# Patient Record
Sex: MALE | Race: WHITE | Employment: UNEMPLOYED | URBAN - METROPOLITAN AREA
[De-identification: names, ages, dates, MRNs, and addresses within clinical notes are randomized per-mention and may not be internally consistent; named-entity substitution may affect disease eponyms.]

---

## 2020-06-08 ENCOUNTER — APPOINTMENT (OUTPATIENT)
Dept: GENERAL RADIOLOGY | Age: 55
End: 2020-06-08
Payer: MEDICAID

## 2020-06-08 ENCOUNTER — HOSPITAL ENCOUNTER (EMERGENCY)
Age: 55
Discharge: HOME | End: 2020-06-08
Payer: MEDICAID

## 2020-06-08 VITALS
OXYGEN SATURATION: 92 % | HEART RATE: 100 BPM | BODY MASS INDEX: 33.86 KG/M2 | HEIGHT: 72 IN | TEMPERATURE: 98.8 F | WEIGHT: 250 LBS | SYSTOLIC BLOOD PRESSURE: 113 MMHG | RESPIRATION RATE: 20 BRPM | DIASTOLIC BLOOD PRESSURE: 64 MMHG

## 2020-06-08 PROCEDURE — 99212 OFFICE O/P EST SF 10 MIN: CPT

## 2020-06-08 PROCEDURE — 71046 X-RAY EXAM CHEST 2 VIEWS: CPT

## 2020-06-08 RX ORDER — ZIPRASIDONE HYDROCHLORIDE 80 MG/1
80 CAPSULE ORAL 2 TIMES DAILY WITH MEALS
COMMUNITY

## 2020-06-08 RX ORDER — SERTRALINE HYDROCHLORIDE 100 MG/1
100 TABLET, FILM COATED ORAL DAILY
COMMUNITY

## 2020-06-08 RX ORDER — HALOPERIDOL 10 MG/1
5 TABLET ORAL DAILY
COMMUNITY

## 2020-06-08 RX ORDER — DULOXETIN HYDROCHLORIDE 30 MG/1
30 CAPSULE, DELAYED RELEASE ORAL 3 TIMES DAILY
COMMUNITY

## 2020-06-08 RX ORDER — DOXYCYCLINE 100 MG/1
100 CAPSULE ORAL 2 TIMES DAILY
Qty: 20 CAPSULE | Refills: 0 | Status: SHIPPED | OUTPATIENT
Start: 2020-06-08 | End: 2020-06-18

## 2020-06-08 RX ORDER — PREDNISONE 20 MG/1
TABLET ORAL
Qty: 8 TABLET | Refills: 0 | Status: SHIPPED | OUTPATIENT
Start: 2020-06-08

## 2020-06-08 RX ORDER — ALBUTEROL SULFATE 90 UG/1
2 AEROSOL, METERED RESPIRATORY (INHALATION) 4 TIMES DAILY PRN
Qty: 1 INHALER | Refills: 0 | Status: SHIPPED | OUTPATIENT
Start: 2020-06-08

## 2020-06-08 RX ORDER — GUAIFENESIN AND DEXTROMETHORPHAN HYDROBROMIDE 1200; 60 MG/1; MG/1
1 TABLET, EXTENDED RELEASE ORAL EVERY 12 HOURS PRN
Qty: 12 TABLET | Refills: 0 | Status: SHIPPED | OUTPATIENT
Start: 2020-06-08

## 2020-06-08 RX ORDER — OLANZAPINE 10 MG/1
10 TABLET ORAL NIGHTLY
COMMUNITY

## 2020-06-08 NOTE — ED PROVIDER NOTES
This is a 64-year-old male that presents to urgent care complaining of a cough off and on for the past week or so. He had been to a pharmacy and took a COVID screening test and that was negative. Has been using some cough drops. Had a fever last week. Currently denies any shortness of breath. No chest pain. No abdominal pain nausea vomiting diarrhea or urinary symptoms on first contact patient he appears to be in no acute distress. He does have a history of smoking. Review of Systems   Constitutional:        Pertinent positives and negatives are stated within HPI, all other systems reviewed and are negative. Physical Exam  Vitals signs and nursing note reviewed. Constitutional:       Appearance: He is well-developed. HENT:      Head: Normocephalic and atraumatic. Jaw: No trismus. Right Ear: Hearing, tympanic membrane, ear canal and external ear normal.      Left Ear: Hearing, tympanic membrane, ear canal and external ear normal.      Nose: Nose normal.      Right Sinus: No maxillary sinus tenderness or frontal sinus tenderness. Left Sinus: No maxillary sinus tenderness or frontal sinus tenderness. Mouth/Throat:      Pharynx: Uvula midline. No uvula swelling. Eyes:      General: Lids are normal.      Conjunctiva/sclera: Conjunctivae normal.      Pupils: Pupils are equal, round, and reactive to light. Neck:      Musculoskeletal: Full passive range of motion without pain, normal range of motion and neck supple. Cardiovascular:      Rate and Rhythm: Normal rate and regular rhythm. Heart sounds: Normal heart sounds. No murmur. Pulmonary:      Effort: Pulmonary effort is normal.      Breath sounds: Normal breath sounds. Abdominal:      General: Bowel sounds are normal.      Palpations: Abdomen is soft. Abdomen is not rigid. Tenderness: There is no abdominal tenderness. There is no guarding or rebound. Skin:     General: Skin is warm and dry.

## 2020-06-09 ENCOUNTER — CARE COORDINATION (OUTPATIENT)
Dept: CARE COORDINATION | Age: 55
End: 2020-06-09

## 2020-06-10 ENCOUNTER — CARE COORDINATION (OUTPATIENT)
Dept: CARE COORDINATION | Age: 55
End: 2020-06-10

## 2020-06-10 NOTE — CARE COORDINATION
COVID-19 ED/Flu Clinic Initial Outreach Note    Patient contacted regarding recent visit for viral symptoms. This Eliz Dyer attempted to contact the patient for second outreach by telephone to perform post discharge call. Left message. Will no longer make an outreach.

## 2022-04-01 ENCOUNTER — TELEPHONE (OUTPATIENT)
Dept: NEUROLOGY | Age: 57
End: 2022-04-01

## 2022-04-01 ENCOUNTER — OFFICE VISIT (OUTPATIENT)
Dept: NEUROLOGY | Age: 57
End: 2022-04-01
Payer: MEDICAID

## 2022-04-01 VITALS
HEIGHT: 72 IN | OXYGEN SATURATION: 99 % | WEIGHT: 227 LBS | SYSTOLIC BLOOD PRESSURE: 113 MMHG | HEART RATE: 84 BPM | DIASTOLIC BLOOD PRESSURE: 66 MMHG | BODY MASS INDEX: 30.75 KG/M2 | TEMPERATURE: 97.8 F

## 2022-04-01 DIAGNOSIS — R26.89 BALANCE PROBLEMS: ICD-10-CM

## 2022-04-01 DIAGNOSIS — R41.3 MEMORY LOSS: Primary | ICD-10-CM

## 2022-04-01 PROCEDURE — G8427 DOCREV CUR MEDS BY ELIG CLIN: HCPCS | Performed by: NURSE PRACTITIONER

## 2022-04-01 PROCEDURE — 99204 OFFICE O/P NEW MOD 45 MIN: CPT | Performed by: NURSE PRACTITIONER

## 2022-04-01 PROCEDURE — 1036F TOBACCO NON-USER: CPT | Performed by: NURSE PRACTITIONER

## 2022-04-01 PROCEDURE — G8417 CALC BMI ABV UP PARAM F/U: HCPCS | Performed by: NURSE PRACTITIONER

## 2022-04-01 PROCEDURE — 3017F COLORECTAL CA SCREEN DOC REV: CPT | Performed by: NURSE PRACTITIONER

## 2022-04-01 NOTE — PROGRESS NOTES
[facility]. Max Gomez M.D., F.A.C.P. Harmeet Coats, LINDA, APRN, CNS  Margaret Dakin. Dinorah Ahumada, MSN, APRN-FNP-C  Donya Marques MSN, APRN, FNP-C  Sabina BLACK PA-C  Cachorro Has MSN, APRN, FNP-C  [address], [address]  [place], [address] [address]  Phone: [phone]  Fax: [phone]       Dayna Espinosa is a 59 y.o. right handed male     Patient presents to neurology clinic today with concerns for memory loss. Patient presents with his sister Davidson Calixto; his sister Sirena Hedrick waited in the lobby. Patient is a questionable historian. Patient sister Davidson Calixto provides most of the history. Past Medical History:     Past Medical History:   Diagnosis Date    Depression     Hyperlipidemia     Schizoaffective disorder ([facility] [address]     Type 2 diabetes mellitus ([facility] [address]        Past Surgical History:       No past surgical history on file. Allergies:       Patient has no known allergies. Medications:     Prior to Admission medications    Medication Sig Start Date End Date Taking? Authorizing Provider   ziprasidone (GEODON) 80 MG capsule Take 80 mg by mouth 2 times daily (with meals)   Yes Historical Provider, MD   sertraline (ZOLOFT) 100 MG tablet Take 100 mg by mouth daily   Yes Historical Provider, MD   haloperidol (HALDOL) 10 MG tablet Take 10 mg by mouth daily   Yes Historical Provider, MD   OLANZapine (ZYPREXA) 10 MG tablet Take 10 mg by mouth nightly   Yes Historical Provider, MD   DULoxetine (CYMBALTA) 30 MG extended release capsule Take 30 mg by mouth 3 times daily   Yes Historical Provider, MD   predniSONE (DELTASONE) 20 MG tablet Take 2 tablets by mouth daily x 4 days.  6/8/20  Yes Ezekiel Hernandez PA-C   Dextromethorphan-guaiFENesin (MUCINEX DM MAXIMUM STRENGTH)  MG TB12 Take 1 tablet by mouth every 12 hours as needed (cough) 6/8/20  Yes Ezekiel Hernandez PA-C   albuterol sulfate HFA (PROVENTIL HFA) 108 (90 Base) MCG/ACT inhaler Inhale 2 puffs into the lungs 4 times daily as needed for Wheezing or Shortness of Breath 6/8/20  Yes Kristian Lozano PA-C       Social History:        reports that he quit smoking about 9 years ago. His smoking use included cigarettes. He has never used smokeless tobacco. He reports that he does not drink alcohol and does not use drugs. Patient is single and has 3 children--- His daughter passed away at age 22 3 years ago; he has another daughter age 32 and a son age 21. Review of Systems:     (+) Memory issues  No chest pain or palpitations  No SOB  No vertigo, lightheadedness or loss of consciousness  No falls, tripping or stumbling  No incontinence of bowels or bladder  No itching or bruising appreciated  No numbness, tingling or focal arm/leg weakness    ROS is otherwise negative    Family History:     No family history on file. History of Present Illness:     Patient's family noticed memory issues in 2019 when he moved back here from [place]. Patient sister states she noticed he was having trouble understanding and comprehending directions. Patient is also forgetful of the specifics of these jobs in the past.  Patient is forgetful of task such as forgetting to fill the . Patient needs guidance in checking his emails and doing other things on the computer. Patient is forgetting to take his medications and forgetting what he ate for dinner the night before. Patient still drives but has got mixed up when going to [place] to see his kids and gets lost when driving to new places as he does not understand how to use a GPS successfully. Patient also gets confused when using a different route to go somewhere. Patient also has quite frequently how to get somewhere. Patient is to write a list to go to the grocery store and has to be reminded of upcoming birthdays. Patient sister manages his finances. Patient cooks with his mom at times but they do receive mobile meals Monday through Friday.   Patient is working with a job counseling specialist and is having a hard time responding to questions being asked of him even if they have been worked on before. Patient is not forgetting familiar people or wandering of. There has been no behavioral changes. Of note patient was admitted to the psych unit for about 10 days in August 2021. Patient's daughter did pass about 3 years ago in August.  There has been no neuro evaluation or neuroimaging completed in the past.  Patient had labs completed with PCP and is set to have some soon. These were not sent over with his file for today. She denies dizziness, headache, speech or swallowing difficulty, LOC, seizure activity or focal weakness. Patient sleeps approximately 9 hours at night. He eats 3 meals a day and drinks 4 to 5 cups of water. Patient drinks diet Pepsi or diet Coke 16 ounces 3-4 times a day. Patient reports a mild stress level. Patient walks a few times a week for exercise. Objective:       /66   Pulse 84   Temp 97.8 °F (36.6 °C) (Temporal)   Ht 6' (1.829 m)   Wt 227 lb (103 kg)   SpO2 99%   BMI 30.79 kg/m²     General appearance: alert, appears stated age, cooperative and in no distress  Head: normocephalic, without obvious abnormality, atraumatic  Eyes: conjunctivae/corneas clear; no drainage  Neck:  supple, symmetrical, trachea midline and thyroid not enlarged  Back: symmetric, no curvature.  ROM normal.   Lungs: clear to auscultation bilaterally  Heart: regular rate and rhythm  Abdomen: soft, non-tender; bowel sounds normal  Extremities: normal, atraumatic, no cyanosis or edema  Pulses: 2+ and symmetric  Skin:  color, texture, turgor normal--no rashes or lesions      Mental Status: alert and oriented x 3--- struggles to state the city but able to state--[place], slow to process but appropriate, difficulty with recall, follows simple and complex commands well    Appropriate attention/concentration  Intact fundus of knowledge  Memories intact    Able to state current president and provide 5 past presidents  Able to state , age, address, phone number  Able to draw a clock without difficulty  Able to write a sentence  Able to spell world backwards    Speech: no dysarthria  Language: no aphasias---reading, writing, repetition, and object identification intact    No Myerson's    Cranial Nerves:  I: smell  intact   II: visual acuity     II: visual fields Full to confrontation   II: pupils PERRL   III,VII: ptosis None   III,IV,VI: extraocular muscles  EOMI without nystagmus   V: mastication Normal   V: facial light touch sensation  Normal   V,VII: corneal reflex     VII: facial muscle function - upper  Normal   VII: facial muscle function - lower Normal   VIII: hearing Normal   IX: soft palate elevation  Normal   IX,X: gag reflex    XI: trapezius strength  5/5   XI: sternocleidomastoid strength 5/5   XI: neck extension strength  5/5   XII: tongue strength  Normal     Motor:  5/5 throughout  Normal bulk and tone  No drift   No abnormal movements    No cogwheeling, bradykinesia, dyskinesia    Sensory:  LT and PP normal  Vibration normal    Coordination:   FN, FFM and HELEN normal  HS normal    Gait:  Normal    No turn en bloc  No shuffling gait    DTR:   Right Brachioradialis reflex 1+  Left Brachioradialis reflex 1+  Right Biceps reflex 1+  Left Biceps reflex 1+  Right Triceps reflex 1+  Left Triceps reflex 1+  Right Quadriceps reflex 1+  Left Quadriceps reflex 1+  Right Achilles reflex 1+  Left Achilles reflex 1+    No Babinskis  No Gavin's    No grasp reflexes    No other pathological reflexes    Laboratory/Radiology:  ry/Radiology:     No recent labs or imaging to review at the time of this visit    Assessment:     Memory decline   Initially noticed by family 2019 however patient was not living by family prior to that   Patient is forgetful of task, specifics of his jobs in the past, and 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 Patient does not manage his finances or cook independently   Patient has to make a list for the grocery store and has to be reminded of upcoming birthdays   Will obtain MRI brain with and without contrast to rule out acute intracranial process   Memory testing may be needed in the near future   Today patient answers appropriately however is slow to respond and has difficulty with recall      Plan:     MRI brain with and without contrast  CBC, CMP, TSH, hepatitis panel, UA, B12, folate, vitamin D, vitamin B1  Call with any issues  Return to office in 3 months        CK Alanis   3:58 PM  4/1/2022    I spent 45 minutes with this patient obtaining the HPI and discussing the exam with greater than 50% of the time providing counseling and education on medications and other treatment plans. All questions were answered prior to leaving my office.

## 2022-04-13 ENCOUNTER — HOSPITAL ENCOUNTER (OUTPATIENT)
Dept: MRI IMAGING | Age: 57
Discharge: HOME | End: 2022-04-15
Payer: MEDICAID

## 2022-04-13 ENCOUNTER — HOSPITAL ENCOUNTER (OUTPATIENT)
Age: 57
Discharge: HOME | End: 2022-04-13
Payer: MEDICAID

## 2022-04-13 DIAGNOSIS — R26.89 BALANCE PROBLEMS: ICD-10-CM

## 2022-04-13 DIAGNOSIS — R41.3 MEMORY LOSS: ICD-10-CM

## 2022-04-13 LAB
ALBUMIN SERPL-MCNC: 4.3 G/DL (ref 3.5–5.2)
ALP BLD-CCNC: 69 U/L (ref 40–129)
ALT SERPL-CCNC: 13 U/L (ref 0–40)
ANION GAP SERPL CALCULATED.3IONS-SCNC: 14 MMOL/L (ref 7–16)
AST SERPL-CCNC: 16 U/L (ref 0–39)
BILIRUB SERPL-MCNC: 0.3 MG/DL (ref 0–1.2)
BILIRUBIN URINE: NEGATIVE
BLOOD, URINE: NEGATIVE
BUN BLDV-MCNC: 13 MG/DL (ref 6–20)
CALCIUM SERPL-MCNC: 9.5 MG/DL (ref 8.6–10.2)
CHLORIDE BLD-SCNC: 99 MMOL/L (ref 98–107)
CLARITY: CLEAR
CO2: 24 MMOL/L (ref 22–29)
COLOR: YELLOW
CREAT SERPL-MCNC: 0.7 MG/DL (ref 0.7–1.2)
FOLATE: 12.7 NG/ML (ref 4.8–24.2)
GFR AFRICAN AMERICAN: >60
GFR NON-AFRICAN AMERICAN: >60 ML/MIN/1.73
GLUCOSE BLD-MCNC: 106 MG/DL (ref 74–99)
GLUCOSE URINE: NEGATIVE MG/DL
HCT VFR BLD CALC: 45.2 % (ref 37–54)
HEMOGLOBIN: 15.4 G/DL (ref 12.5–16.5)
KETONES, URINE: NEGATIVE MG/DL
LEUKOCYTE ESTERASE, URINE: NEGATIVE
MCH RBC QN AUTO: 29.8 PG (ref 26–35)
MCHC RBC AUTO-ENTMCNC: 34.1 % (ref 32–34.5)
MCV RBC AUTO: 87.6 FL (ref 80–99.9)
NITRITE, URINE: NEGATIVE
PDW BLD-RTO: 13.1 FL (ref 11.5–15)
PH UA: 6.5 (ref 5–9)
PLATELET # BLD: 164 E9/L (ref 130–450)
PMV BLD AUTO: 10 FL (ref 7–12)
POTASSIUM SERPL-SCNC: 4.6 MMOL/L (ref 3.5–5)
PROTEIN UA: NEGATIVE MG/DL
RBC # BLD: 5.16 E12/L (ref 3.8–5.8)
SODIUM BLD-SCNC: 137 MMOL/L (ref 132–146)
SPECIFIC GRAVITY UA: 1.01 (ref 1–1.03)
TOTAL PROTEIN: 7.6 G/DL (ref 6.4–8.3)
TSH SERPL DL<=0.05 MIU/L-ACNC: 3.2 UIU/ML (ref 0.27–4.2)
UROBILINOGEN, URINE: 0.2 E.U./DL
VITAMIN B-12: 473 PG/ML (ref 211–946)
VITAMIN D 25-HYDROXY: 59 NG/ML (ref 30–100)
WBC # BLD: 4.1 E9/L (ref 4.5–11.5)

## 2022-04-13 PROCEDURE — 81003 URINALYSIS AUTO W/O SCOPE: CPT

## 2022-04-13 PROCEDURE — 70553 MRI BRAIN STEM W/O & W/DYE: CPT

## 2022-04-13 PROCEDURE — 80053 COMPREHEN METABOLIC PANEL: CPT

## 2022-04-13 PROCEDURE — 84425 ASSAY OF VITAMIN B-1: CPT

## 2022-04-13 PROCEDURE — 85027 COMPLETE CBC AUTOMATED: CPT

## 2022-04-13 PROCEDURE — 84443 ASSAY THYROID STIM HORMONE: CPT

## 2022-04-13 PROCEDURE — 82746 ASSAY OF FOLIC ACID SERUM: CPT

## 2022-04-13 PROCEDURE — 36415 COLL VENOUS BLD VENIPUNCTURE: CPT

## 2022-04-13 PROCEDURE — 80074 ACUTE HEPATITIS PANEL: CPT

## 2022-04-13 PROCEDURE — 82607 VITAMIN B-12: CPT

## 2022-04-13 PROCEDURE — 82306 VITAMIN D 25 HYDROXY: CPT

## 2022-04-13 PROCEDURE — A9577 INJ MULTIHANCE: HCPCS | Performed by: RADIOLOGY

## 2022-04-13 PROCEDURE — 6360000004 HC RX CONTRAST MEDICATION: Performed by: RADIOLOGY

## 2022-04-13 RX ADMIN — GADOBENATE DIMEGLUMINE 20 ML: 529 INJECTION, SOLUTION INTRAVENOUS at 15:54

## 2022-04-14 LAB
HAV IGM SER IA-ACNC: NORMAL
HEPATITIS B CORE IGM ANTIBODY: NORMAL
HEPATITIS B SURFACE ANTIGEN INTERPRETATION: NORMAL
HEPATITIS C ANTIBODY INTERPRETATION: NORMAL

## 2022-04-20 LAB — VITAMIN B1 WHOLE BLOOD: 112 NMOL/L (ref 70–180)

## 2022-05-26 ENCOUNTER — TELEPHONE (OUTPATIENT)
Dept: NEUROLOGY | Age: 57
End: 2022-05-26

## 2022-05-26 NOTE — TELEPHONE ENCOUNTER
Attempted to reach patient's sister per her request but there was no answer. Voicemail left. Will await her call back.

## 2022-05-26 NOTE — TELEPHONE ENCOUNTER
Patient's sister called saying she is growing more concerned for Chase Blevins. She said he gets lost more often. A few days ago he was driving for over an hour because he did not know where he was. Patient's sister would like to know if there is anything they can do between now and Dusty's next appointment. Please advise.

## 2022-06-09 NOTE — TELEPHONE ENCOUNTER
Patient's sister callled back to talk to [facility].   Electronically signed by Lor Valenzuela MA on 6/9/22 at 3:18 PM EDT

## 2022-06-13 NOTE — TELEPHONE ENCOUNTER
Called back patient's sister Lachelle Matthews who states that patient has gotten lost at least twice and did not call her when he got lost.  She is unsure of how many total times he has been out driving and gotten lost but did not call her to let her know. Usually if he calls she is able to direct him as to how to get where he is going. Patient's 2 sisters are very concerned with this. MRI brain and routine labs discussed at length today over the phone. Advised her to follow-up next visit as of only seen him once for further testing such as either formal cognitive evaluation or neuropsych evaluation. Patient's last exam he answered appropriately and follow commands. I will need to see again for further recommendations. All questions and concerns addressed. Time spent on call over 20 minutes. This message was sent for informational purposes only.

## 2022-07-20 ENCOUNTER — OFFICE VISIT (OUTPATIENT)
Dept: NEUROLOGY | Age: 57
End: 2022-07-20
Payer: MEDICAID

## 2022-07-20 VITALS
HEART RATE: 81 BPM | TEMPERATURE: 97 F | OXYGEN SATURATION: 7 % | DIASTOLIC BLOOD PRESSURE: 69 MMHG | SYSTOLIC BLOOD PRESSURE: 121 MMHG

## 2022-07-20 DIAGNOSIS — R41.3 MEMORY LOSS: Primary | ICD-10-CM

## 2022-07-20 PROCEDURE — 99215 OFFICE O/P EST HI 40 MIN: CPT | Performed by: NURSE PRACTITIONER

## 2022-07-20 RX ORDER — BENZTROPINE MESYLATE 2 MG/1
2 TABLET ORAL 2 TIMES DAILY
COMMUNITY

## 2022-07-20 RX ORDER — BUSPIRONE HYDROCHLORIDE 10 MG/1
10 TABLET ORAL 3 TIMES DAILY
COMMUNITY

## 2022-07-20 RX ORDER — HALOPERIDOL DECANOATE 100 MG/ML
INJECTION INTRAMUSCULAR
COMMUNITY
Start: 2022-06-29

## 2022-07-20 RX ORDER — OXCARBAZEPINE 300 MG/1
300 TABLET, FILM COATED ORAL 2 TIMES DAILY
COMMUNITY

## 2022-07-20 RX ORDER — ATORVASTATIN CALCIUM 10 MG/1
10 TABLET, FILM COATED ORAL DAILY
COMMUNITY

## 2022-07-20 RX ORDER — LEVOTHYROXINE SODIUM 0.03 MG/1
TABLET ORAL
COMMUNITY
Start: 2022-04-18

## 2022-07-20 NOTE — PROGRESS NOTES
[address] [facility]. Marci Sullivan M.D., F.A.C.P. Maira Hoyos, LINDA, APRN, CNS  Bill Domingo. Marleen Vega, MSN, APRN-FNP-C  Mamie Reyna MSN, APRN, FNP-C  Gordy BLACK, LISE  [address] MSN, APRN, FNP-C  [address], [facility] [address]  [place], [address] [address]  Phone: [phone]  Fax: [phone]       Carla Urrutia is a 59 y.o. right handed male     Patient presents to neurology clinic today with concerns for memory loss. Patient presents with his sister Allen Canseco. Patient is a poor historian. Patient sister Allen Canseco provides most of the history. Patient's family noticed memory issues in 2019 when he moved back here from [place]. Patient sister states she noticed he was having trouble understanding and comprehending directions. Patient is also forgetful of the specifics of these jobs in the past.  Patient is forgetful of task such as forgetting to fill the . Patient needs guidance in checking his emails and doing other things on the computer. Patient is forgetting to take his medications and forgetting what he ate for dinner the night before. Patient still drives but has got mixed up when going to [place] to see his kids and gets lost when driving to new places as he does not understand how to use a GPS successfully. Patient also gets confused when using a different route to go somewhere. Patient also has quite frequently how to get somewhere. Patient is to write a list to go to the grocery store and has to be reminded of upcoming birthdays. Patient sister manages his finances. Patient cooks with his mom at times but they do receive mobile meals Monday through Friday. Patient is working with a job counseling specialist and is having a hard time responding to questions being asked of him even if they have been worked on before. Patient is not forgetting familiar people or wandering of. There has been no behavioral changes.   Of note patient was admitted to the psych unit for about 10 days in August 2021. Patient's daughter did pass about 3 years ago in August.  There has been no neuro evaluation or neuroimaging completed in the past.  Patient had labs completed with PCP and is set to have some soon. These were not sent over with his file for today. She denies dizziness, headache, speech or swallowing difficulty, LOC, seizure activity or focal weakness. Since patient's last visit he has gotten lost while driving at least twice. Patient had not worked since December 2019 and was assisted to find a job by [employer]. Patient was on his job for about a month when he quit. On 7/7 patient drove past a job and came back home without going to work. On 7/9 patient went to work and stayed for less than an hour before he quit. Patient believes he might of gotten frustrated and quit abruptly but does not give specifics. Patient's sister states that he has been working with a counselor at [employer] who is feeling patient does not talk to her. She has adjusted medications in the past and attempts to help with memory but patient had to be titrated back up. Patient has also been having issues with balance but has not fallen. Patient has to catch himself by holding onto things; patient's sister says this is daily or every other day but patient does not feel it is that frequent or a current issue. Patient sleeps approximately 9 hours at night. He eats 3 meals a day and drinks 4 to 5 cups of water. Patient drinks diet Pepsi or diet Coke 16 ounces 3-4 times a day. Patient reports a mild stress level. Patient walks a few times a week for exercise. He reports that he quit smoking about 10 years ago. His smoking use included cigarettes. He has never used smokeless tobacco. He reports that he does not drink alcohol and does not use drugs.   Patient is single and has 3 children--- His daughter passed away at age 22 3 years ago; he has another daughter age 32 and a son age 21. Objective:       /69   Pulse 81   Temp 97 °F (36.1 °C)   SpO2 (!) 7%     General appearance: alert, appears stated age, cooperative and in no distress  Head: normocephalic, without obvious abnormality, atraumatic  Eyes: conjunctivae/corneas clear; no drainage  Neck:  supple, symmetrical, trachea midline and thyroid not enlarged  Back: symmetric, no curvature.  ROM normal.   Lungs: clear to auscultation bilaterally  Heart: regular rate and rhythm  Abdomen: soft, non-tender; bowel sounds normal  Extremities: normal, atraumatic, no cyanosis or edema  Pulses: 2+ and symmetric  Skin:  color, texture, turgor normal--no rashes or lesions      Mental Status: alert and oriented x 4, slow to process at times but improved today; appropriate, follows simple and complex commands well    Appropriate attention/concentration  Intact fundus of knowledge  Memories intact    Able to state current president and provide 5 past presidents  Able to state , age, address, phone number  Able to draw a clock without difficulty  Able to write a sentence  Able to spell world backwards    Speech: no dysarthria  Language: no aphasias---reading, writing, repetition, and object identification intact    No Myerson's    Cranial Nerves:  I: smell  intact   II: visual acuity     II: visual fields Full to confrontation   II: pupils PERRL   III,VII: ptosis None   III,IV,VI: extraocular muscles  EOMI without nystagmus   V: mastication Normal   V: facial light touch sensation  Normal   V,VII: corneal reflex     VII: facial muscle function - upper  Normal   VII: facial muscle function - lower Normal   VIII: hearing Normal   IX: soft palate elevation  Normal   IX,X: gag reflex    XI: trapezius strength  5/5   XI: sternocleidomastoid strength 5/5   XI: neck extension strength  5/5   XII: tongue strength  Normal     Motor:  5/5 throughout  Normal bulk and tone  No drift   No abnormal movements    No cogwheeling, bradykinesia, dyskinesia    Sensory:  LT and PP normal  Vibration normal    Coordination:   FN, FFM and HELEN normal  HS normal    Gait:  Normal    No turn en bloc  No shuffling gait    DTR:   Right Brachioradialis reflex 1+  Left Brachioradialis reflex 1+  Right Biceps reflex 1+  Left Biceps reflex 1+  Right Triceps reflex 1+  Left Triceps reflex 1+  Right Quadriceps reflex 1+  Left Quadriceps reflex 1+  Right Achilles reflex 1+  Left Achilles reflex 1+    No Babinskis  No Gavin's    No grasp reflexes    No other pathological reflexes    Laboratory/Radiology:  ry/Radiology:     Lab Results   Component Value Date    WBC 4.1 (L) 04/13/2022    HGB 15.4 04/13/2022    HCT 45.2 04/13/2022    MCV 87.6 04/13/2022     04/13/2022      Lab Results   Component Value Date     04/13/2022    K 4.6 04/13/2022    CL 99 04/13/2022    CO2 24 04/13/2022    BUN 13 04/13/2022    CREATININE 0.7 04/13/2022    GLUCOSE 106 (H) 04/13/2022    CALCIUM 9.5 04/13/2022    PROT 7.6 04/13/2022    LABALBU 4.3 04/13/2022    BILITOT 0.3 04/13/2022    ALKPHOS 69 04/13/2022    AST 16 04/13/2022    ALT 13 04/13/2022    LABGLOM >60 04/13/2022    GFRAA >60 04/13/2022     Lab Results   Component Value Date    TSH 3.200 04/13/2022      Lab Results   Component Value Date/Time    VITD25 59 04/13/2022 02:40 PM       Lab Results   Component Value Date    IGMGBKRH81 473 04/13/2022      Lab Results   Component Value Date    FOLATE 12.7 04/13/2022      Hepatitis panel negative  UA negative   B1 normal    MRI brain with and without contrast 4/13/2022: Unremarkable for age    Assessment:     Memory decline   Initially noticed by family 2019 however patient was not living by family prior to that   Patient is forgetful of task, specifics of his jobs in the past, and 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   Patient does not manage his finances or cook independently   Patient has to make a list for the grocery store and has to be reminded of upcoming birthdays   MRI brain with and without contrast ruled out acute intracranial process   Neuro-psych evaluation ordered today   Memory testing may be needed in the near future   Today patient answers all questions appropriately, follows commands and is appropriate     Plan:     Neuropsych eval  Will defer memory agents for now  Call with any issues  Return to office after testing completed         Colletta Schiff, APRN - NP-C   4:39 PM  7/20/2022    I spent 45 minutes with this patient obtaining the HPI and discussing the exam with greater than 50% of the time providing counseling and education on medications and other treatment plans. All questions were answered prior to leaving my office.

## 2022-12-07 ENCOUNTER — SCHEDULED TELEPHONE ENCOUNTER (OUTPATIENT)
Dept: NEUROLOGY | Age: 57
End: 2022-12-07
Payer: MEDICAID

## 2022-12-07 DIAGNOSIS — R41.841 COGNITIVE COMMUNICATION DEFICIT: Primary | ICD-10-CM

## 2022-12-07 PROCEDURE — 99423 OL DIG E/M SVC 21+ MIN: CPT | Performed by: NURSE PRACTITIONER

## 2022-12-07 RX ORDER — PROPRANOLOL HYDROCHLORIDE 10 MG/1
10 TABLET ORAL 2 TIMES DAILY
COMMUNITY

## 2022-12-07 NOTE — PROGRESS NOTES
[address] [facility]. Kacie Morrow M.D., F.A.C.P. Justin Freedman, LINDA, APRN, CNS  Selvinmamie Mendiola. Fredis Chaves, MSN, APRN-FNP-C  Cyrus Godoy MSN, APRN, FNP-C  Larisa BLACK, PA-C  [address] MSN, APRN, FNP-C  [address], [address]  [place], [address] Raffi Whiting  Phone: [phone]  Fax: [phone]           The patient and/or health care decision maker is aware that that he/she may receive a bill for this telephone service, depending on his/her insurance coverage, and has provided verbal consent to proceed: Yes     I affirm this is a Patient Initiated Episode with an Established Patient who has not had a related appointment within my department in the past 7 days or scheduled within the next 24 hours. The patient's identity was verified at the start of the call. Others involved in call: sister Naz Lanza     Total Time: minutes: 35 minutes    Note: not billable if this call serves to triage the patient into an appointment for the relevant concern    HPI:     Since patient's last visit, he feels he is doing well. 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 Patient started working about 2 weeks ago. Patient reports not getting lost but has asked for reassurance and direction at least 1-2 times. Patient has had his neuropsych evaluation which was consistent with dementia with possible vascular changes in the brain contributing to cognitive deficits seen on testing in/or the use of Haldol. Patient had normal scarring in the areas of orientation, visual processing speed, clock drawing, copying a diagram and remembering a diagram.  Patient slightly was below normal and areas of attention, mental processing speed, judging distances, naming objects, minimal words in a given category, verbal learning, verbal memory and shifting from one task to another. See full neuropsych report under media.   Recommendations were discussed with patient and his sister today as well as the results of the overall test.  Patient's sister continues to encourage patient to remember tasks, keep calendars and work on [employer] however she is finding he is not remembering to do these things. She is attempting to have him keep his receipts and 1, place however he forgets to do so. When asked has Haldol to discuss with psychiatry they report that they do not feel Haldol can be switched to right now as it is helping to control his psychiatric illnesses. It was stressed today that Haldol is suspected to be part of the cause of some of these memory issues but at this time he continues to take it under the advice of psychiatry. I have recommended memory games as well as organizational tasks, keeping lists and calendars as well as continued work on sleep hygiene, exercise and adequate nutrition and hydration emphasized on prior visits. All questions and concerns addressed today. (+) memory issues   No chest pain or palpitations  No SOB  No vertigo, lightheadedness or loss of consciousness  No falls, tripping or stumbling  No incontinence of bowels or bladder  No itching or bruising   No numbness, tingling or focal arm/leg weakness    ROS otherwise negative      Medications:     Prior to Admission medications    Medication Sig Start Date End Date Taking? Authorizing Provider   propranolol (INDERAL) 10 MG tablet Take 10 mg by mouth in the morning and at bedtime   Yes Historical Provider, MD   haloperidol decanoate (HALDOL DECANOATE) 100 MG/ML injection  6/29/22  Yes Historical Provider, MD   levothyroxine (SYNTHROID) 25 MCG tablet TAKE ONE TABLET BY MOUTH EVERY DAY 4/18/22  Yes Historical Provider, MD   atorvastatin (LIPITOR) 10 MG tablet Take 10 mg by mouth in the morning.    Yes Historical Provider, MD   sertraline (ZOLOFT) 100 MG tablet Take 100 mg by mouth daily   Yes Historical Provider, MD   haloperidol (HALDOL) 10 MG tablet Take 5 mg by mouth in the morning. Yes Historical Provider, MD       Objective:     Mental Status Exam: sounds alert, oriented x4; thought processes appropriate    Speech sounds clear    Breathing Effort sounds normal    Laboratory/Radiology:     CBC with Differential:    Lab Results   Component Value Date/Time    WBC 4.1 04/13/2022 02:40 PM    RBC 5.16 04/13/2022 02:40 PM    HGB 15.4 04/13/2022 02:40 PM    HCT 45.2 04/13/2022 02:40 PM     04/13/2022 02:40 PM    MCV 87.6 04/13/2022 02:40 PM    MCH 29.8 04/13/2022 02:40 PM    MCHC 34.1 04/13/2022 02:40 PM    RDW 13.1 04/13/2022 02:40 PM     CMP:    Lab Results   Component Value Date/Time     04/13/2022 02:40 PM    K 4.6 04/13/2022 02:40 PM    CL 99 04/13/2022 02:40 PM    CO2 24 04/13/2022 02:40 PM    BUN 13 04/13/2022 02:40 PM    CREATININE 0.7 04/13/2022 02:40 PM    GFRAA >60 04/13/2022 02:40 PM    LABGLOM >60 04/13/2022 02:40 PM    GLUCOSE 106 04/13/2022 02:40 PM    PROT 7.6 04/13/2022 02:40 PM    LABALBU 4.3 04/13/2022 02:40 PM    CALCIUM 9.5 04/13/2022 02:40 PM    BILITOT 0.3 04/13/2022 02:40 PM    ALKPHOS 69 04/13/2022 02:40 PM    AST 16 04/13/2022 02:40 PM    ALT 13 04/13/2022 02:40 PM     Hepatitis panel negative  UA negative   B1 normal     MRI brain with and without contrast 4/13/2022: Unremarkable for age    All labs and images personally reviewed today    Assessment:     Memory decline              Initially noticed by family 2019 however patient was not living by family prior to that              Patient is forgetful of task, specifics of his jobs in the past, and 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   Patient does not manage his finances or cook independently              Patient has to make a list for the grocery store and has to be reminded of upcoming birthdays              MRI brain with and without contrast ruled out acute intracranial process Neuro-psych evaluation consistent with dementia with possible vascular changes; MRI brain is not consistent with vascular dementia. I suspect Haldol and chronic use of other psych meds have led to his memory issues. This is only complicated by caffeine overuse and his psych issues. Formal cognitive evaluation will be ordered today              All questions and concerns addressed today. Results of neuropsych test discussed at length with patient and his sister. Lifestyle modifications   Adequate nutrition and hydration, caffeine reduction, regular exercise regimen and stress reduction recommended. Recommending minimizing distractions, writing out checklist, keeping calendars and writing down pertinent information from others in conversation to help with memory. Also recommended keeping frequently used items in a common place to avoid losing them.       Plan:     Formal cognitive evaluation   Memory agents will most likely be started after completion of above  Will defer memory agents for now  Call with any issues  Return to office after formal cognitive evaluation completed      Florance School, CK - NP, CK--CNP  3:43 PM  12/7/2022

## 2022-12-07 NOTE — PROGRESS NOTES
Finesse Concepcion was read the following message We want to confirm that, for purposes of billing, this is a virtual visit with your provider for which we will submit a claim for reimbursement with your insurance company. You will be responsible for any copays, coinsurance amounts or other amounts not covered by your insurance company. If you do not accept this, unfortunately we will not be able to schedule or proceed with a virtual visit with the provider. Do you accept? Delonte Mckeon responded Yes .

## 2022-12-13 ENCOUNTER — EVALUATION (OUTPATIENT)
Dept: SPEECH THERAPY | Age: 57
End: 2022-12-13

## 2022-12-13 DIAGNOSIS — R41.841 COGNITIVE COMMUNICATION DEFICIT: Primary | ICD-10-CM

## 2022-12-16 NOTE — PROGRESS NOTES
[address] [facility]  Outpatient Speech Therapy  Phone: [phone]   Fax:  [phone]    SPEECH/LANGUAGE PATHOLOGY  ROSS INFORMATION PROCESSING ASSESSMENT-2 (RIPA-2)   EVALUATION RESULTS and PLAN OF CARE    PATIENT NAME:  Jarek Cardozo  (male)     MRN:  [id number]    :  1965  (59 y.o.)  STATUS:  Outpatient clinic   TODAY'S DATE:  2022  REFERRING PROVIDER:    AYSHA Sanchez             PROVIDER NPI:  [id number]  SPECIFIC PROVIDER ORDER: [facility]  Date of order:  2022  EVALUATING THERAPIST: AMALIA Hoff    CERTIFICATION/RECERTIFICATION PERIOD: 2022 to 3/12/23  INSURANCE PROVIDER:  Payor: [facility] / Plan: [place] [id number] / Product Type: *No Product type* /    INSURANCE ID:  [id number] - (Medicaid Managed)   SECONDARY INSURANCE (if applicable):        CPT Codes  EVALUATION: 45616  standardized cognitive performance testing (per hour)    TREATMENT:  Requesting treatment authorization for  12 visits over 12 weeks focusing on the following CPT codes:      15488  therapeutic interventions that focus on cognitive function , initial  15 min  85257  therapeutic interventions that focus on cognitive function, each additional 15 min  64493  speech/language tx     REFERRING/TREATMENT DIAGNOSIS: Cognitive communication deficit [R41.841]          SPEECH THERAPY  PLAN OF CARE   The speech therapy  POC is established based on physician order, speech pathology diagnosis and results of clinical assessment     SPEECH PATHOLOGY DIAGNOSIS:    moderate cognitive communication deficit    Outpatient Speech Pathology intervention is recommended 1 time  per week for the above certification period.     Conditions Requiring Skilled Therapeutic Intervention for speech, language and/or cognition    Decreased short term memory  Decreased problem solving skills   Decreased thought organization    Specific Speech Therapy Interventions to Include: Therapeutic tasks for Cognition    Specific instructions for next treatment: To initiate memory tasks  To initiate thought organization tasks    SHORT/LONG TERM GOALS  Pt will improve immediate, short term, recent memory during structured and unstructured tasks with 80% accuracy   Pt will improve problem solving/thought organization during structured and unstructured tasks with 80% accuracy     Patient goals: Patient/family involved in developing goals and treatment plan:   Treatment goals discussed with Patient and Family    The Patient and Family understand(s) the diagnosis, prognosis and plan of care   The patient/family Agreed with above,     This plan may be re-evaluated and revised as warranted. Rehabilitation Potential/Prognosis: bhakti Pandya was accompanied to this evaluation by his sister, Kenyatta Hoang, who served as the primary informant during this evaluation. They state that Cullen Pandya has been having difficulty with his cognitive skills for the last few years. Cullen Pandya currently resides alone with family help. He has had episodes of leaving his doors unlocked, got lost 1 time while driving, and difficulty with remembering when to take his medications. Stimulus questions were obtained from the RIPA-2.   There are 30 possible points for each subtest.   Severity ratings for each subtest were determined as follows:     RAW SCORE  SEVERITY    28-30  Within functional limits (WFL)   25-27  Mild deficit    22-24  Moderate deficit    19-21  Marked deficit    16-18 Severe deficit   Below 16 Profound deficit         Subtest  Raw Score /Severity    Immediate Memory  24/30 MODERATE   Recent Memory   27/30 MILD   Temporal Orientation   (Recent Memory)   30/30 WFL   Temporal Orientation   (Remote Memory)  30/30 WFL   Spatial Orientation   30/30 WFL   Orientation to Environment   30/30 [facility]   Recall of General Information 30/30 WFL   Problem Solving & Abstract Reasoning   27/30 MILD   Thought Organization   25/30 MILD   Auditory Processing   & Retention   30/30 WFL     VARIANT OBSERVATIONS PRESENT DURING THE EVALUATION:   Errors  Partially correct  Self corrected                               EDUCATION:   The Speech Language Pathologist (SLP) completed education regarding results of evaluation and that intervention is warranted at this time. Learner: Patient and Family  Education: Reviewed results and recommendations of this evaluation  Evaluation of Education:  Chloe Correia understanding    This plan may be re-evaluated and revised as warranted. Treatment goals discussed with Patient and Family   The Patient and Family understand(s) the diagnosis, prognosis and plan of care     Evaluation Time includes thorough review of current medical information, gathering information on past medical history/social history and prior level of function, completion of standardized testing/informal observation of tasks, assessment of data and education on plan of care and goals. The admitting diagnosis and active problem list, as listed below have been reviewed prior to initiation of this evaluation. ACTIVE PROBLEM LIST: There is no problem list on file for this patient. Jeaneth Nguyen. Yaneth Moreno MA/MERNA-SLP  [address] [facility].           Phone: [phone]       If you have any questions or concerns, please don't hesitate to call. Thank you for your referral.    Physician/Provider Signature:________________________________Date:__________________  By signing above, the therapists plan is approved by the physician/provider. All patients under Mehran Ram must be signed by physician/provider.

## 2023-01-19 ENCOUNTER — TELEPHONE (OUTPATIENT)
Dept: NEUROLOGY | Age: 58
End: 2023-01-19

## 2023-01-20 ENCOUNTER — TREATMENT (OUTPATIENT)
Dept: SPEECH THERAPY | Age: 58
End: 2023-01-20
Payer: MEDICAID

## 2023-01-20 DIAGNOSIS — R41.841 COGNITIVE COMMUNICATION DEFICIT: Primary | ICD-10-CM

## 2023-01-20 PROCEDURE — 92507 TX SP LANG VOICE COMM INDIV: CPT | Performed by: SPEECH-LANGUAGE PATHOLOGIST

## 2023-01-27 ENCOUNTER — TREATMENT (OUTPATIENT)
Dept: SPEECH THERAPY | Age: 58
End: 2023-01-27
Payer: MEDICAID

## 2023-01-27 DIAGNOSIS — R41.841 COGNITIVE COMMUNICATION DEFICIT: Primary | ICD-10-CM

## 2023-01-27 PROCEDURE — 92507 TX SP LANG VOICE COMM INDIV: CPT | Performed by: SPEECH-LANGUAGE PATHOLOGIST

## 2023-01-27 NOTE — PROGRESS NOTES
Patient seen for 60 minute in person session this date with sister present. Patient reports doing well. Sister reports issues with medication compliance and getting confused when driving. Today, we spent time discussing where issues are occurring within his daily activities that are impacting his functional independence. Several recommendations were made for systems to be put into place to help with reminders for medications, daily activities, and driving. We worked on short term recall of associated words in groups of 5 and 6 words. Patient completed all tasks with fair/fair+ performance with min cues/repetitions. Homework 'brain games' discussed and encouraged. Will continue. Shorty Vazquez.  Elsa Hinds MA/CCC-SLP  [id number]  CPT [id number]

## 2023-02-02 ENCOUNTER — TREATMENT (OUTPATIENT)
Dept: SPEECH THERAPY | Age: 58
End: 2023-02-02
Payer: MEDICAID

## 2023-02-02 DIAGNOSIS — R41.841 COGNITIVE COMMUNICATION DEFICIT: Primary | ICD-10-CM

## 2023-02-02 PROCEDURE — 92507 TX SP LANG VOICE COMM INDIV: CPT | Performed by: SPEECH-LANGUAGE PATHOLOGIST

## 2023-02-02 NOTE — PROGRESS NOTES
Patient seen for 60 minute in person session with sister present this date. They report patient has been doing ok. Sister states that he has been doing better with medication management but she and his other sister call daily with reminders. We set the alarms for medication administration last session but patient admits to sometimes just shutting the alarm off and not remembering to then take his medications. We discussed possibly have a guided journal to write in. He was agreeable to this and we will look at possibilities next session. Today, we worked on short term recall of words presented verbally and repeated in reverse order. He completed the task with 3 words with 100% acc;  with 4 words 80% acc;  recall of words in alphabetical order - 3 words at 90% and 4 words at 70%. All with min cues and repetitions. Short term recall of random 4 words presented verbally with a short delay resulted in patient achieving 25% acc with mod/max cues. Homework activities encouraged. Will continue. Jagruti Meza.  Zainab Godwin MA/CCC-SLP  [id number]  [facility] [address]

## 2023-02-07 ENCOUNTER — TREATMENT (OUTPATIENT)
Dept: SPEECH THERAPY | Age: 58
End: 2023-02-07
Payer: MEDICAID

## 2023-02-07 DIAGNOSIS — R41.841 COGNITIVE COMMUNICATION DEFICIT: Primary | ICD-10-CM

## 2023-02-07 PROCEDURE — 92507 TX SP LANG VOICE COMM INDIV: CPT | Performed by: SPEECH-LANGUAGE PATHOLOGIST

## 2023-02-14 ENCOUNTER — TREATMENT (OUTPATIENT)
Dept: SPEECH THERAPY | Age: 58
End: 2023-02-14
Payer: MEDICAID

## 2023-02-14 DIAGNOSIS — R41.841 COGNITIVE COMMUNICATION DEFICIT: Primary | ICD-10-CM

## 2023-02-14 PROCEDURE — 92507 TX SP LANG VOICE COMM INDIV: CPT | Performed by: SPEECH-LANGUAGE PATHOLOGIST

## 2023-02-15 NOTE — PROGRESS NOTES
Patient seen for 45 minute in person session this date. Patient arrived alone again and reported no difficulties driving to this appointment. Today, we worked on deduction puzzles. At first, he needed mod/max cues to determine answers. However, on 2nd puzzle, he was able to work more quickly and accurately with only min cues needed. He was provided with additional puzzles to work on for homework. We also worked on short term recall of scrambled sentences presented verbally where he would not only have to recall the words provided but also mentally manipulate the words to rearrange them into a sentence that was accurate. He struggled with this task today and needed mod repetitions of the words. Homework activities encouraged. Will continue. Celso Greco.  Rema Montero MA/CCC-SLP  [id number]  [facility] [address]

## 2023-02-15 NOTE — PROGRESS NOTES
Patient seen for 45 minute in person session this date. Patient arrived without his sister who was not able to make this appointment. Patient arrived and stated that he had no problems recalling directions to arrive here safely and he states that he did not need to call his sister for any help. Today, we spent time reviewing potential templates for a memory journal.  Patient was able to determine what he feels would be functional for him for everyday use to stay organized and to journal activities. We will work on preparing this for him. We worked on short term recall tasks presented verbally with a short delay added. He completed all tasks with fair+ performance with min/mod cues. Homework activities sent after explanation. Will continue. Hannah Abbott.  Alex Ga MA/CCC-SLP  [id number]  CPT [id number]

## 2023-02-15 NOTE — PROGRESS NOTES
Patient seen for 45 minute in person session this date. Patient drove himself today with no reported problems. He states that he has been doing well with no significant issues reported. He reports that he did work on the deduction puzzles but forgot to bring them today. Instructed patient to bring them next session so we can determine his  accuracy. He agreed. Today, we worked again on short term recall of scrambled sentences of increasing lengths. He improved significantly today to good performance with min cues. He also needed decreased repetitions to recall the words. We worked on a visual memory recall game with patient achieving 68% acc. Homework activities encouraged. Will continue. Iris Sethi.  Cliff Alex MA/CCC-SLP  [id number]  CPT [id number]

## 2023-02-24 ENCOUNTER — TREATMENT (OUTPATIENT)
Dept: SPEECH THERAPY | Age: 58
End: 2023-02-24
Payer: MEDICAID

## 2023-02-24 DIAGNOSIS — R41.841 COGNITIVE COMMUNICATION DEFICIT: Primary | ICD-10-CM

## 2023-02-24 PROCEDURE — 92507 TX SP LANG VOICE COMM INDIV: CPT | Performed by: SPEECH-LANGUAGE PATHOLOGIST

## 2023-03-07 ENCOUNTER — TREATMENT (OUTPATIENT)
Dept: SPEECH THERAPY | Age: 58
End: 2023-03-07
Payer: MEDICAID

## 2023-03-07 DIAGNOSIS — R41.841 COGNITIVE COMMUNICATION DEFICIT: Primary | ICD-10-CM

## 2023-03-07 PROCEDURE — 92507 TX SP LANG VOICE COMM INDIV: CPT | Performed by: SPEECH-LANGUAGE PATHOLOGIST

## 2023-03-07 NOTE — PROGRESS NOTES
Patient seen for 45 minute in person session this date with sister present. Patient reports he has been doing well with no issues this past week per his report. His sister reports some repeated phone calls from patient to confirm activities/appointments. Today, we worked on answering his sister's questions about a few issues that she has been seeing. We discussed several suggestions to address the issues and how to implement them. Patient was cooperative with all ideas and their implementation with ADL's. Homework tasks encouraged. Will continue. Hannah Abbott.  Alex Ga MA/CCC-SLP  [id number]  [facility] [address]

## 2023-03-14 ENCOUNTER — TREATMENT (OUTPATIENT)
Dept: SPEECH THERAPY | Age: 58
End: 2023-03-14
Payer: MEDICAID

## 2023-03-14 DIAGNOSIS — R41.841 COGNITIVE COMMUNICATION DEFICIT: Primary | ICD-10-CM

## 2023-03-14 PROCEDURE — 92507 TX SP LANG VOICE COMM INDIV: CPT | Performed by: SPEECH-LANGUAGE PATHOLOGIST

## 2023-03-16 NOTE — PROGRESS NOTES
Patient seen for 45 minute in person session this date. Patient reports he is doing ok. Unable to recall any specifics about his work or other activities this week. He forgot to bring his planner this date but states that he is using it. Encouraged him to bring it next session. Today, we retested cognitive skills. He has progressed from moderate to mild scores in Immediate Memory and from mild to within functional limits (wfl) with problem solving and reasoning skills. He continues to have difficulty with thought organization tasks remaining at a low mild deficit. However, today, he was stimulable for improved thought organization skills when mod/max cues were given. We also worked on short term recall of objects presented visually with patient achieving 75% acc/94 standard score with task today. Homework tasks encouraged. Will continue. Rogelio Rodriguez.  Verónica Berrios MA/CCC-SLP  [id number]  CPT [id number]

## 2023-03-16 NOTE — PROGRESS NOTES
Patient seen for 45 minute in person session this date. Patient doing well per his report. Today, we worked on short term recall of faces and words presented visually with use of IPAD program.  He completed recall of faces with 70% acc/92 standard score with min cues;  recall of words with 60% acc/74 standard score with first trial and 68% recall/86 standard score on 2nd trial with all new words. Patient brought a new planner today. It appears as though both he and his sister are writing in it. He states that he feels it is helping him with organization and enabling him to write things down that his sister asks him about later. He was instructed on strategies to implement to maximize use of the planner. He states he will implement. Homework activities encouraged. Will continue. Voncile Freeze.  Jony Bansal MA/MERNA-SLP  [id number]  [facility] [address]

## 2023-03-21 ENCOUNTER — TREATMENT (OUTPATIENT)
Dept: SPEECH THERAPY | Age: 58
End: 2023-03-21
Payer: MEDICAID

## 2023-03-21 DIAGNOSIS — R41.841 COGNITIVE COMMUNICATION DEFICIT: Primary | ICD-10-CM

## 2023-03-21 PROCEDURE — 92507 TX SP LANG VOICE COMM INDIV: CPT | Performed by: SPEECH-LANGUAGE PATHOLOGIST

## 2023-04-04 ENCOUNTER — TREATMENT (OUTPATIENT)
Dept: SPEECH THERAPY | Age: 58
End: 2023-04-04

## 2023-04-04 DIAGNOSIS — R41.841 COGNITIVE COMMUNICATION DEFICIT: Primary | ICD-10-CM

## 2023-04-05 NOTE — PROGRESS NOTES
Patient seen for 45 minute in person session this date. Patient reports he is doing ok. He forgot to bring his planner this date. Reminder provided to bring next session for review. Today, we worked on letter fluency/thought organization task with patient able to generate avg 11 words in 1 minute with min cueing provided. We also worked on short term recall of mid length sentences with a short delay added. Patient completed the tasks with fair/fair+ recall with min cues/repetitions. He reports no issues with taking meds, doing daily chores, or recalling appointments. Therapist will need to confirm this with patient's sister for accuracy. We discussed need for daily 'homework' exercises as he reports that he doesn't always do his 'brain exercises'. Will continue. Rita Plata.  Tricia Oliva MA/CCC-SLP  [id number]  [facility] [address]

## 2023-04-21 NOTE — PROGRESS NOTES
Patient seen for 45 minute in person session with sister present this date. Patient doing well per his report but sister states that he has had difficulty with motivation and follow through of some tasks. We spent this session reviewing his planner and providing instruction as to how to use it more effectively to help with thought organization and recall. He expressed understanding and his sister states that she will encourage him. We discussed new goals and all agreed that insurance will be contacted to obtain authorization for additional visits. Once the authorization is received, we will resume therapy but patient will be placed on hold at this time until authorization is received. All were in agreement. Homework tasks encouraged to continue as instructed. Jb May.  Stacy Ferrell MA/CCC-SLP  [id number]  CPT [id number]

## 2023-04-25 ENCOUNTER — TREATMENT (OUTPATIENT)
Dept: SPEECH THERAPY | Age: 58
End: 2023-04-25
Payer: MEDICAID

## 2023-04-25 DIAGNOSIS — R41.841 COGNITIVE COMMUNICATION DEFICIT: Primary | ICD-10-CM

## 2023-04-25 PROCEDURE — 92507 TX SP LANG VOICE COMM INDIV: CPT | Performed by: SPEECH-LANGUAGE PATHOLOGIST

## 2023-05-02 ENCOUNTER — TREATMENT (OUTPATIENT)
Dept: SPEECH THERAPY | Age: 58
End: 2023-05-02
Payer: MEDICAID

## 2023-05-02 DIAGNOSIS — R41.841 COGNITIVE COMMUNICATION DEFICIT: Primary | ICD-10-CM

## 2023-05-02 PROCEDURE — 92507 TX SP LANG VOICE COMM INDIV: CPT | Performed by: SPEECH-LANGUAGE PATHOLOGIST

## 2023-05-09 ENCOUNTER — TREATMENT (OUTPATIENT)
Dept: SPEECH THERAPY | Age: 58
End: 2023-05-09
Payer: MEDICAID

## 2023-05-09 DIAGNOSIS — R41.841 COGNITIVE COMMUNICATION DEFICIT: Primary | ICD-10-CM

## 2023-05-09 PROCEDURE — 92507 TX SP LANG VOICE COMM INDIV: CPT | Performed by: SPEECH-LANGUAGE PATHOLOGIST

## 2023-05-11 NOTE — PROGRESS NOTES
Patient seen for 45 minute in person session this date. Patient doing well. We received authorization for additional 12 visits starting this date. Today, he reports that he is playing brain games a few days a week. Today, we focused on visual thought organization, processing speed, and recall with tasks on the IPAD. He completed the following:  Attention to Details - 93% acc; Changing Focus - 74% acc; Mental Rotation - 81% acc and word memory recall at 63% acc. Homework encouraged daily. Will continue. Ovidio Farias.  Kirill Holden MA/CCC-SLP  [id number]  [facility] [address]

## 2023-05-24 NOTE — PROGRESS NOTES
Patient seen for 45 minute in person session this date. Patient reports he is doing well and reports that he is playing 'brain games' a few times a week. Discussed importance of playing for 15-30 minutes daily. He forgot his planner today but states that he has been writing in it with chores needing done. He denies any issues at home or when driving. Today, we worked on [address] tasks targeting attention to detail, processing speed, and changing focus, along with recall. He completed all with fair+ performance with min cues. Most difficulty noted with word memory and changing focus tasks and the strongest task was in attention to detail. Homework tasks encouraged. Will continue. Mal Smith.  Kita Mansfield MA/CCC-SLP  [id number]  [facility] [address]

## 2023-06-06 ENCOUNTER — TREATMENT (OUTPATIENT)
Dept: SPEECH THERAPY | Age: 58
End: 2023-06-06
Payer: MEDICAID

## 2023-06-06 DIAGNOSIS — R41.841 COGNITIVE COMMUNICATION DEFICIT: Primary | ICD-10-CM

## 2023-06-06 PROCEDURE — 92507 TX SP LANG VOICE COMM INDIV: CPT | Performed by: SPEECH-LANGUAGE PATHOLOGIST

## 2023-06-06 NOTE — PROGRESS NOTES
Patient seen for 45 minute in person session this date. Patient reports that he is doing well. No planner today. We focused on short term recall tasks presented visually today via IPAD. He completed:  word memory task with 73% acc; memory flow levels 1 and 2 with avg 90% acc; mental categories with 97% acc and spatial memory 2 with 84% acc. Patients recall with tasks appears good but concerns are that functional recall of ADL's and medication compliance remain fair. Homework activities encouraged. Will continue. Tomasz Garcia.  Kamar Giron MA/[facility]-SLP  [id number]  [facility] [address]

## 2023-06-13 ENCOUNTER — TREATMENT (OUTPATIENT)
Dept: SPEECH THERAPY | Age: 58
End: 2023-06-13
Payer: MEDICAID

## 2023-06-13 DIAGNOSIS — R41.841 COGNITIVE COMMUNICATION DEFICIT: Primary | ICD-10-CM

## 2023-06-13 PROCEDURE — 92507 TX SP LANG VOICE COMM INDIV: CPT | Performed by: SPEECH-LANGUAGE PATHOLOGIST

## 2023-06-20 ENCOUNTER — TREATMENT (OUTPATIENT)
Dept: SPEECH THERAPY | Age: 58
End: 2023-06-20
Payer: MEDICAID

## 2023-06-20 DIAGNOSIS — R41.841 COGNITIVE COMMUNICATION DEFICIT: Primary | ICD-10-CM

## 2023-06-20 PROCEDURE — 92507 TX SP LANG VOICE COMM INDIV: CPT | Performed by: SPEECH-LANGUAGE PATHOLOGIST

## 2023-06-27 ENCOUNTER — TREATMENT (OUTPATIENT)
Dept: SPEECH THERAPY | Age: 58
End: 2023-06-27
Payer: MEDICAID

## 2023-06-27 DIAGNOSIS — R41.841 COGNITIVE COMMUNICATION DEFICIT: Primary | ICD-10-CM

## 2023-06-27 PROCEDURE — 92507 TX SP LANG VOICE COMM INDIV: CPT | Performed by: SPEECH-LANGUAGE PATHOLOGIST

## 2023-07-07 NOTE — PROGRESS NOTES
Patient seen for 45 minute in person session this date. Patient did remember to bring his planner today, but he has not been writing in it as instructed. We spent time showing him how to use it more effectively and explained why this will help him. He continues to state that he will try to remember to write more in it. Today, we worked on following 4 step directives following instructions on chunking them to recall easier. He completed the task with no delays in between presenting the directives verbally and executing them, with 60% acc with mod cues. Three-step directive remain at 80% with no delay but drop to 65% with a short delay added. Homework practice encouraged. Will continue. Nimco Moreau.  Carey Abbott MA/MERNA-SLP  [id number]  [facility] [id number]

## 2023-07-07 NOTE — PROGRESS NOTES
Patient seen for 45 minute in person session this date. Patient reports doing ok. Did not remember to bring his planner this date. Patient denies any significant issues this past in regards to recall difficulties or forgetting meds/appts/etc.  Today, we focused on short term recall of pictures on a piece of paper. He was instructed prior to studying the pics to look at the pictures and group in categories to improve recall. He completed the tasks achieving 66% avg acc. We worked briefly on following 3 step commands with patient achieving 70% avg acc with mod cues and repetitions. Homework activities and journaling encouraged. Will continue. Lynda Johnson.  Briseyda Perry MA/CCC-SLP  [id number]  [facility] [address]

## 2023-07-07 NOTE — PROGRESS NOTES
Patient seen for 45 minute in person session this date. Patient reports doing ok. Did not bring his planner today and had difficulty recalling past weeks activities. Reinforced need to use it to journal daily activities to help with recall. Today, we worked on short term recall of list of words presented visually. Instruction provided on how to chunk and categorize words for easier recall. Patient started off with achieving 75% with recall but towards the end of session, accuracy dropped to 50% possibly due to fatigue. Encouraged homework activities. Will continue. Darrel Elder.  Abhilash Osullivan MA/MERNA-SLP  [id number]  [facility] [address]

## 2023-07-11 ENCOUNTER — TREATMENT (OUTPATIENT)
Dept: SPEECH THERAPY | Age: 58
End: 2023-07-11

## 2023-07-11 DIAGNOSIS — R41.841 COGNITIVE COMMUNICATION DEFICIT: Primary | ICD-10-CM

## 2023-07-13 ENCOUNTER — OFFICE VISIT (OUTPATIENT)
Dept: NEUROLOGY | Age: 58
End: 2023-07-13
Payer: MEDICAID

## 2023-07-13 VITALS
HEART RATE: 62 BPM | HEIGHT: 72 IN | OXYGEN SATURATION: 95 % | TEMPERATURE: 97.7 F | BODY MASS INDEX: 30.48 KG/M2 | DIASTOLIC BLOOD PRESSURE: 72 MMHG | WEIGHT: 225 LBS | SYSTOLIC BLOOD PRESSURE: 111 MMHG

## 2023-07-13 DIAGNOSIS — F33.9 DEPRESSION, RECURRENT (HCC): ICD-10-CM

## 2023-07-13 DIAGNOSIS — Z91.89 LACK OF MOTIVATION: ICD-10-CM

## 2023-07-13 DIAGNOSIS — E55.9 VITAMIN D DEFICIENCY: ICD-10-CM

## 2023-07-13 DIAGNOSIS — R41.3 MEMORY LOSS: ICD-10-CM

## 2023-07-13 DIAGNOSIS — R41.841 COGNITIVE COMMUNICATION DEFICIT: Primary | ICD-10-CM

## 2023-07-13 DIAGNOSIS — E03.9 ACQUIRED HYPOTHYROIDISM: ICD-10-CM

## 2023-07-13 PROCEDURE — 99214 OFFICE O/P EST MOD 30 MIN: CPT | Performed by: NURSE PRACTITIONER

## 2023-07-13 PROCEDURE — 1036F TOBACCO NON-USER: CPT | Performed by: NURSE PRACTITIONER

## 2023-07-13 PROCEDURE — G8417 CALC BMI ABV UP PARAM F/U: HCPCS | Performed by: NURSE PRACTITIONER

## 2023-07-13 PROCEDURE — G8427 DOCREV CUR MEDS BY ELIG CLIN: HCPCS | Performed by: NURSE PRACTITIONER

## 2023-07-13 PROCEDURE — 3017F COLORECTAL CA SCREEN DOC REV: CPT | Performed by: NURSE PRACTITIONER

## 2023-07-13 RX ORDER — DONEPEZIL HYDROCHLORIDE 5 MG/1
5 TABLET, FILM COATED ORAL NIGHTLY
Qty: 30 TABLET | Refills: 3 | Status: SHIPPED | OUTPATIENT
Start: 2023-07-13

## 2023-07-13 RX ORDER — MEMANTINE HYDROCHLORIDE 5 MG/1
5 TABLET ORAL 2 TIMES DAILY
Qty: 180 TABLET | Refills: 0 | Status: SHIPPED | OUTPATIENT
Start: 2023-07-13 | End: 2023-10-11

## 2023-07-13 NOTE — PROGRESS NOTES
[id number] [facility] [address] Erendira Thomas M.D., F.A.C.PJaneth Randall, LINDA, APRN, CNS  Samir Ko, MSN, APRN-FNP-C  Isadora Faith MSN, APRN, FNP-C  [place] VijayVANI Carpenter-CASSANDRA  [address] [facility] MSN, APRN, FNP-C  [address] [facility], [address] [place] [address]  Phone: [phone]  Fax: [phone]       Josiane Yuan is a 59 y.o. right handed male     Patient presents to neurology clinic today for cognitive impairment     Patient presents with his sister who provides most of the history. Patient is a questionable historian. Patient's family noticed memory issues in 2019 when he moved back here from [place]. Patient sister states she noticed he was having trouble understanding and comprehending directions. Patient is also forgetful of the specifics of these jobs in the past.  Patient is forgetful of task such as forgetting to fill the . Patient needs guidance in checking his emails and doing other things on the computer. Patient is forgetting to take his medications and forgetting what he ate for dinner the night before. Patient still drives but has got mixed up when going to [place] to see his kids and gets lost when driving to new places as he does not understand how to use a GPS successfully. Patient also gets confused when using a different route to go somewhere. Patient also has quite frequently how to get somewhere. Patient is to write a list to go to the grocery store and has to be reminded of upcoming birthdays. Patient sister manages his finances. Patient cooks with his mom at times but they do receive mobile meals Monday through Friday. Patient is working with a job counseling specialist and is having a hard time responding to questions being asked of him even if they have been worked on before. Patient is not forgetting familiar people or wandering of. There has been no behavioral changes.   Of note patient was admitted to the

## 2023-07-19 ENCOUNTER — TREATMENT (OUTPATIENT)
Dept: SPEECH THERAPY | Age: 58
End: 2023-07-19

## 2023-07-19 DIAGNOSIS — R41.841 COGNITIVE COMMUNICATION DEFICIT: Primary | ICD-10-CM

## 2023-07-25 ENCOUNTER — TREATMENT (OUTPATIENT)
Dept: SPEECH THERAPY | Age: 58
End: 2023-07-25
Payer: MEDICAID

## 2023-07-25 DIAGNOSIS — R41.841 COGNITIVE COMMUNICATION DEFICIT: Primary | ICD-10-CM

## 2023-07-25 PROCEDURE — 92507 TX SP LANG VOICE COMM INDIV: CPT | Performed by: SPEECH-LANGUAGE PATHOLOGIST

## 2023-08-05 RX ORDER — DONEPEZIL HYDROCHLORIDE 5 MG/1
10 TABLET, FILM COATED ORAL NIGHTLY
Qty: 180 TABLET | Refills: 0 | Status: SHIPPED | OUTPATIENT
Start: 2023-08-05 | End: 2023-11-03

## 2023-08-11 NOTE — PROGRESS NOTES
Patient seen for 45 minute in person session this date. Patient doing fair overall. Forgot his planner again today but reports that he has been writing in it. Wrote a note to remind him for next session. He struggles with recall of prior days events but denies having any problems. Will try to confirm this with his sister. Today, we worked on short term recall of matching faces with names with no delay added. He struggled with this task achieving 75% acc with mod/max cues and extra processing time needed. His accuracy does continue to decrease as the session progresses although he denies fatigue. He also reports that he hasn't been following through with daily homework tasks but states he will try to implement as instructed. Will continue. [place] Vazquez.  Esther Raya MA/MERNA-SLP  [id number]  [facility] [address]

## 2023-08-18 NOTE — PROGRESS NOTES
Patient seen for 45 minute in person session this date. Patient reports he is doing ok. He denies any significant issues arising although this cannot be verified as he did not bring his planner and his sister was not present to confirm. Today, we retested cognitive skills via RIPA-2. Overall, he continues to make good gains on goals. While he has made improvement in the areas of:  Immediate memory from moderate to mild impairment, recent memory from mild impairment to 'within functional limits',  and thought organization from moderate to mild impairment, he continues to have issues with following through with tasks, recalling functional recent events, and focus on tasks. We discussed these results and patient was instructed to discuss continuing therapy with his sister and we will determine next session if we will request authorization for additional sessions or not. It is this therapist's recommendation that we continue with therapy due to progress made to date and further gains expected with continued therapy. Manav Ibrahim.  Mckay Velasquez, MA/MERNA-SLP  [id number]  [facility] [address]

## 2023-08-23 NOTE — PROGRESS NOTES
Patient seen for 45 minute in person session with sister present this date. We reviewed his results of retesting with his sister and explained the progress made to date. We discussed the issues that she sees that patient is still struggling with such as schedule planning and functional recall of daily tasks. We agreed that due to the fact that he has made good gains overall with therapy and that continued oliva are expected with additional therapy, we would request authorization from his insurance for an additional 8 visits. Today, we worked on setting up written instructions in his planner to cue him to write more details. We will resume therapy once authorization from his insurance is received. They understand and are in agreement. Patient instructed to continue with all daily brain exercises until therapy resumes. Darrel Elder.  Abhilash Osullivan MA/MERNA-SLP  [phone]  [facility] [address]

## 2023-10-13 RX ORDER — MEMANTINE HYDROCHLORIDE 5 MG/1
5 TABLET ORAL 2 TIMES DAILY
Qty: 180 TABLET | Refills: 0 | Status: SHIPPED | OUTPATIENT
Start: 2023-10-13 | End: 2024-01-11

## 2024-01-16 PROBLEM — M54.50 CHRONIC LOW BACK PAIN: Status: ACTIVE | Noted: 2022-10-18

## 2024-01-16 PROBLEM — E11.51 DIABETIC PERIPHERAL ANGIOPATHY (HCC): Status: ACTIVE | Noted: 2023-07-12

## 2024-01-16 PROBLEM — R41.3 MEMORY LOSS: Status: ACTIVE | Noted: 2022-01-10

## 2024-01-16 PROBLEM — R26.89 IMPAIRMENT OF BALANCE: Status: ACTIVE | Noted: 2022-01-10

## 2024-01-16 PROBLEM — E55.9 VITAMIN D DEFICIENCY: Status: ACTIVE | Noted: 2021-03-30

## 2024-01-16 PROBLEM — G47.00 INSOMNIA: Status: ACTIVE | Noted: 2021-03-01

## 2024-01-16 PROBLEM — F41.9 ANXIETY DISORDER: Status: ACTIVE | Noted: 2021-03-01

## 2024-01-16 PROBLEM — M21.6X1 OTHER ACQUIRED DEFORMITIES OF RIGHT FOOT: Status: ACTIVE | Noted: 2023-07-12

## 2024-01-16 PROBLEM — L84 CORNS AND CALLOSITIES: Status: ACTIVE | Noted: 2023-07-12

## 2024-01-16 PROBLEM — G89.29 CHRONIC LOW BACK PAIN: Status: ACTIVE | Noted: 2022-10-18

## 2024-01-16 PROBLEM — F03.90 DEMENTIA (HCC): Status: ACTIVE | Noted: 2024-01-16

## 2024-01-16 PROBLEM — B07.0 VERRUCA PLANTARIS: Status: ACTIVE | Noted: 2023-07-12

## 2024-01-16 PROBLEM — E11.40 TYPE 2 DIABETES MELLITUS WITH DIABETIC NEUROPATHY, UNSPECIFIED (HCC): Status: ACTIVE | Noted: 2023-07-12

## 2024-01-16 PROBLEM — E66.9 OBESITY: Status: ACTIVE | Noted: 2020-07-21

## 2024-01-16 PROBLEM — R73.01 IMPAIRED FASTING GLUCOSE: Status: ACTIVE | Noted: 2021-03-01

## 2024-01-16 PROBLEM — E11.9 TYPE 2 DIABETES MELLITUS (HCC): Status: ACTIVE | Noted: 2021-03-30

## 2024-01-16 PROBLEM — E78.00 HIGH CHOLESTEROL: Status: ACTIVE | Noted: 2024-01-16

## 2024-01-16 PROBLEM — F25.9: Status: ACTIVE | Noted: 2024-01-16

## 2024-01-16 PROBLEM — M20.30 ACQUIRED HALLUX MALLEUS: Status: ACTIVE | Noted: 2023-07-12

## 2024-01-16 PROBLEM — E03.9 HYPOTHYROIDISM: Status: ACTIVE | Noted: 2021-03-30

## 2024-01-16 PROBLEM — B35.1 ONYCHOMYCOSIS: Status: ACTIVE | Noted: 2023-07-12

## 2024-01-16 PROBLEM — E11.65 HYPERGLYCEMIA DUE TO TYPE 2 DIABETES MELLITUS (HCC): Status: ACTIVE | Noted: 2023-07-12

## 2024-01-16 PROBLEM — M20.12 HALLUX VALGUS (ACQUIRED), LEFT FOOT: Status: ACTIVE | Noted: 2023-07-12

## 2024-01-16 PROBLEM — F32.A DEPRESSIVE DISORDER: Status: ACTIVE | Noted: 2021-03-01

## 2024-02-07 ENCOUNTER — HOSPITAL ENCOUNTER (OUTPATIENT)
Dept: INTERVENTIONAL RADIOLOGY/VASCULAR | Age: 59
Discharge: HOME | End: 2024-02-09
Payer: MEDICARE

## 2024-02-07 DIAGNOSIS — R55 SYNCOPE, UNSPECIFIED SYNCOPE TYPE: ICD-10-CM

## 2024-02-07 PROCEDURE — 93880 EXTRACRANIAL BILAT STUDY: CPT

## 2024-02-27 RX ORDER — MEMANTINE HYDROCHLORIDE 10 MG/1
10 TABLET ORAL 2 TIMES DAILY
Qty: 180 TABLET | Refills: 0 | Status: SHIPPED | OUTPATIENT
Start: 2024-02-27 | End: 2024-05-27

## 2024-03-12 ENCOUNTER — OFFICE VISIT (OUTPATIENT)
Dept: NEUROLOGY | Age: 59
End: 2024-03-12
Payer: MEDICARE

## 2024-03-12 VITALS
SYSTOLIC BLOOD PRESSURE: 103 MMHG | HEIGHT: 72 IN | TEMPERATURE: 97.8 F | BODY MASS INDEX: 33.18 KG/M2 | HEART RATE: 65 BPM | WEIGHT: 245 LBS | DIASTOLIC BLOOD PRESSURE: 71 MMHG | OXYGEN SATURATION: 97 %

## 2024-03-12 DIAGNOSIS — R41.3 MEMORY LOSS: ICD-10-CM

## 2024-03-12 DIAGNOSIS — F33.9 DEPRESSION, RECURRENT (HCC): ICD-10-CM

## 2024-03-12 DIAGNOSIS — R41.841 COGNITIVE COMMUNICATION DEFICIT: Primary | ICD-10-CM

## 2024-03-12 DIAGNOSIS — Z91.89 LACK OF MOTIVATION: ICD-10-CM

## 2024-03-12 DIAGNOSIS — E55.9 VITAMIN D DEFICIENCY: ICD-10-CM

## 2024-03-12 DIAGNOSIS — E03.9 ACQUIRED HYPOTHYROIDISM: ICD-10-CM

## 2024-03-12 PROCEDURE — G8484 FLU IMMUNIZE NO ADMIN: HCPCS | Performed by: NURSE PRACTITIONER

## 2024-03-12 PROCEDURE — 3017F COLORECTAL CA SCREEN DOC REV: CPT | Performed by: NURSE PRACTITIONER

## 2024-03-12 PROCEDURE — 99214 OFFICE O/P EST MOD 30 MIN: CPT | Performed by: NURSE PRACTITIONER

## 2024-03-12 PROCEDURE — 1036F TOBACCO NON-USER: CPT | Performed by: NURSE PRACTITIONER

## 2024-03-12 PROCEDURE — G8427 DOCREV CUR MEDS BY ELIG CLIN: HCPCS | Performed by: NURSE PRACTITIONER

## 2024-03-12 PROCEDURE — G8417 CALC BMI ABV UP PARAM F/U: HCPCS | Performed by: NURSE PRACTITIONER

## 2024-03-12 RX ORDER — MEMANTINE HYDROCHLORIDE 10 MG/1
10 TABLET ORAL 2 TIMES DAILY
Qty: 180 TABLET | Refills: 0 | Status: SHIPPED | OUTPATIENT
Start: 2024-03-12 | End: 2024-06-10

## 2024-03-12 RX ORDER — DONEPEZIL HYDROCHLORIDE 23 MG/1
23 TABLET, FILM COATED ORAL NIGHTLY
Qty: 90 TABLET | Refills: 0 | Status: SHIPPED | OUTPATIENT
Start: 2024-03-12 | End: 2024-06-10

## 2024-03-12 NOTE — PROGRESS NOTES
of.  There has been no behavioral changes.  Of note patient was admitted to the psych unit for about 10 days in August 2021.  Patient's daughter did pass about 3 years ago in August.  There has been no neuro evaluation or neuroimaging completed in the past.  Patient had labs completed with PCP and is set to have some soon.  These were not sent over with his file for today.  He denies dizziness, headache, speech or swallowing difficulty, LOC, seizure activity or focal weakness.    In July, since patient's last visit he has gotten lost while driving at least twice.  Patient had not worked since December 2019 and was assisted to find a job by [employer].  Patient was on his job for about a month when he quit.  On 7/7 patient drove past a job and came back home without going to work.  On 7/9 patient went to work and stayed for less than an hour before he quit.  Patient believes he might of gotten frustrated and quit abruptly but does not give specifics. Patient's sister states that he has been working with a counselor at [facility] who is feeling patient does not talk to her.  She has adjusted medications in the past and attempts to help with memory but patient had to be titrated back up. Patient has also been having issues with balance but has not fallen.  Patient has to catch himself by holding onto things; patient's sister says this is daily or every other day but patient does not feel it is that frequent or a current issue.    In December during his phone visit, he feels he is doing well.  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  Patient started working about 2 weeks ago. Patient reports not getting lost but has asked for reassurance and direction at least 1-2 times. Patient has had his neuropsych evaluation which was consistent with dementia with possible vascular changes in the brain contributing to cognitive deficits seen on testing in/or the use of

## 2024-03-25 RX ORDER — MEMANTINE HYDROCHLORIDE 10 MG/1
10 TABLET ORAL 2 TIMES DAILY
Qty: 180 TABLET | Refills: 0 | Status: SHIPPED | OUTPATIENT
Start: 2024-03-25 | End: 2024-06-23

## 2024-03-25 RX ORDER — DONEPEZIL HYDROCHLORIDE 23 MG/1
23 TABLET, FILM COATED ORAL NIGHTLY
Qty: 90 TABLET | Refills: 0 | Status: SHIPPED | OUTPATIENT
Start: 2024-03-25 | End: 2024-06-23

## 2024-04-10 LAB
ALBUMIN SERPL-MCNC: 4.5 G/DL (ref 3.5–5.2)
ALP SERPL-CCNC: 55 U/L (ref 40–129)
ALT SERPL-CCNC: 10 U/L (ref 0–40)
ANION GAP SERPL CALCULATED.3IONS-SCNC: 17 MMOL/L (ref 7–16)
AST SERPL-CCNC: 16 U/L (ref 0–39)
BASOPHILS # BLD: 0.02 K/UL (ref 0–0.2)
BASOPHILS NFR BLD: 1 % (ref 0–2)
BILIRUB SERPL-MCNC: 0.4 MG/DL (ref 0–1.2)
BUN SERPL-MCNC: 20 MG/DL (ref 6–23)
CALCIUM SERPL-MCNC: 9.5 MG/DL (ref 8.6–10.2)
CHLORIDE SERPL-SCNC: 100 MMOL/L (ref 98–107)
CO2 SERPL-SCNC: 25 MMOL/L (ref 22–29)
CREAT SERPL-MCNC: 0.8 MG/DL (ref 0.7–1.2)
EOSINOPHIL # BLD: 0.07 K/UL (ref 0.05–0.5)
EOSINOPHILS RELATIVE PERCENT: 2 % (ref 0–6)
ERYTHROCYTE [DISTWIDTH] IN BLOOD BY AUTOMATED COUNT: 13.3 % (ref 11.5–15)
GFR SERPL CREATININE-BSD FRML MDRD: >90 ML/MIN/1.73M2
GLUCOSE SERPL-MCNC: 117 MG/DL (ref 74–99)
HCT VFR BLD AUTO: 45.3 % (ref 37–54)
HGB BLD-MCNC: 14.9 G/DL (ref 12.5–16.5)
IMM GRANULOCYTES # BLD AUTO: 0.03 K/UL (ref 0–0.58)
IMM GRANULOCYTES NFR BLD: 1 % (ref 0–5)
LYMPHOCYTES NFR BLD: 1.31 K/UL (ref 1.5–4)
LYMPHOCYTES RELATIVE PERCENT: 31 % (ref 20–42)
MCH RBC QN AUTO: 30 PG (ref 26–35)
MCHC RBC AUTO-ENTMCNC: 32.9 G/DL (ref 32–34.5)
MCV RBC AUTO: 91.1 FL (ref 80–99.9)
MONOCYTES NFR BLD: 0.37 K/UL (ref 0.1–0.95)
MONOCYTES NFR BLD: 9 % (ref 2–12)
NEUTROPHILS NFR BLD: 58 % (ref 43–80)
NEUTS SEG NFR BLD: 2.5 K/UL (ref 1.8–7.3)
PLATELET # BLD AUTO: 159 K/UL (ref 130–450)
PMV BLD AUTO: 10.6 FL (ref 7–12)
POTASSIUM SERPL-SCNC: 4.6 MMOL/L (ref 3.5–5)
PROT SERPL-MCNC: 7.3 G/DL (ref 6.4–8.3)
RBC # BLD AUTO: 4.97 M/UL (ref 3.8–5.8)
SODIUM SERPL-SCNC: 142 MMOL/L (ref 132–146)
WBC OTHER # BLD: 4.3 K/UL (ref 4.5–11.5)

## 2024-04-11 LAB
DATE LAST DOSE: ABNORMAL
TME LAST DOSE: ABNORMAL H
VALPROATE SERPL-MCNC: 42 UG/ML (ref 50–100)
VANCOMYCIN DOSE: ABNORMAL MG

## 2024-04-16 ENCOUNTER — HOSPITAL ENCOUNTER (OUTPATIENT)
Age: 59
Discharge: HOME | End: 2024-04-18
Payer: MEDICARE

## 2024-04-16 VITALS
DIASTOLIC BLOOD PRESSURE: 71 MMHG | HEIGHT: 72 IN | WEIGHT: 245 LBS | BODY MASS INDEX: 33.18 KG/M2 | SYSTOLIC BLOOD PRESSURE: 103 MMHG

## 2024-04-16 DIAGNOSIS — R55 SYNCOPE, UNSPECIFIED SYNCOPE TYPE: ICD-10-CM

## 2024-04-16 LAB
ECHO AV AREA PEAK VELOCITY: 2.6 CM2
ECHO AV AREA PEAK VELOCITY: 2.9 CM2
ECHO AV AREA PEAK VELOCITY: 3.1 CM2
ECHO AV AREA PEAK VELOCITY: 3.5 CM2
ECHO AV AREA VTI: 2.2 CM2
ECHO AV AREA/BSA VTI: 0.9 CM2/M2
ECHO AV CUSP MM: 2.2 CM
ECHO AV MEAN GRADIENT: 4 MMHG
ECHO AV MEAN VELOCITY: 1 M/S
ECHO AV PEAK GRADIENT: 5 MMHG
ECHO AV PEAK GRADIENT: 7 MMHG
ECHO AV PEAK VELOCITY: 1.2 M/S
ECHO AV PEAK VELOCITY: 1.4 M/S
ECHO AV VTI: 35.2 CM
ECHO BSA: 2.38 M2
ECHO EST RA PRESSURE: 3 MMHG
ECHO LA DIAMETER INDEX: 1.64 CM/M2
ECHO LA DIAMETER: 3.8 CM
ECHO LA VOL A-L A2C: 57 ML (ref 18–58)
ECHO LA VOL A-L A4C: 52 ML (ref 18–58)
ECHO LA VOL BP: 55 ML (ref 18–58)
ECHO LA VOL MOD A2C: 54 ML (ref 18–58)
ECHO LA VOL MOD A4C: 51 ML (ref 18–58)
ECHO LA VOL/BSA BIPLANE: 24 ML/M2 (ref 16–34)
ECHO LA VOLUME AREA LENGTH: 58 ML
ECHO LA VOLUME INDEX A-L A2C: 25 ML/M2 (ref 16–34)
ECHO LA VOLUME INDEX A-L A4C: 22 ML/M2 (ref 16–34)
ECHO LA VOLUME INDEX AREA LENGTH: 25 ML/M2 (ref 16–34)
ECHO LA VOLUME INDEX MOD A2C: 23 ML/M2 (ref 16–34)
ECHO LA VOLUME INDEX MOD A4C: 22 ML/M2 (ref 16–34)
ECHO LV FRACTIONAL SHORTENING: 42 % (ref 28–44)
ECHO LV INTERNAL DIMENSION DIASTOLE INDEX: 2.24 CM/M2
ECHO LV INTERNAL DIMENSION DIASTOLIC: 5.2 CM (ref 4.2–5.9)
ECHO LV INTERNAL DIMENSION SYSTOLIC INDEX: 1.29 CM/M2
ECHO LV INTERNAL DIMENSION SYSTOLIC: 3 CM
ECHO LV IVSD: 1.1 CM (ref 0.6–1)
ECHO LV IVSS: 1.4 CM
ECHO LV MASS 2D: 207.3 G (ref 88–224)
ECHO LV MASS INDEX 2D: 89.3 G/M2 (ref 49–115)
ECHO LV POSTERIOR WALL DIASTOLIC: 1 CM (ref 0.6–1)
ECHO LV POSTERIOR WALL SYSTOLIC: 1.6 CM
ECHO LV RELATIVE WALL THICKNESS RATIO: 0.38
ECHO LVOT AREA: 3.8 CM2
ECHO LVOT AV VTI INDEX: 0.56
ECHO LVOT DIAM: 2.2 CM
ECHO LVOT MEAN GRADIENT: 2 MMHG
ECHO LVOT PEAK GRADIENT: 3 MMHG
ECHO LVOT PEAK GRADIENT: 4 MMHG
ECHO LVOT PEAK VELOCITY: 0.9 M/S
ECHO LVOT PEAK VELOCITY: 1 M/S
ECHO LVOT STROKE VOLUME INDEX: 32.4 ML/M2
ECHO LVOT SV: 75.2 ML
ECHO LVOT VTI: 19.8 CM
ECHO MV "A" WAVE DURATION: 213.1 MSEC
ECHO MV A VELOCITY: 0.68 M/S
ECHO MV AREA PHT: 2.9 CM2
ECHO MV AREA VTI: 2.6 CM2
ECHO MV E DECELERATION TIME (DT): 234.8 MS
ECHO MV E VELOCITY: 0.87 M/S
ECHO MV E/A RATIO: 1.28
ECHO MV LVOT VTI INDEX: 1.44
ECHO MV MAX VELOCITY: 0.9 M/S
ECHO MV MEAN GRADIENT: 2 MMHG
ECHO MV MEAN VELOCITY: 0.6 M/S
ECHO MV PEAK GRADIENT: 3 MMHG
ECHO MV PRESSURE HALF TIME (PHT): 75.5 MS
ECHO MV VTI: 28.5 CM
ECHO PV MAX VELOCITY: 1.1 M/S
ECHO PV MEAN GRADIENT: 3 MMHG
ECHO PV MEAN VELOCITY: 0.8 M/S
ECHO PV PEAK GRADIENT: 5 MMHG
ECHO PV VTI: 25.6 CM
ECHO PVEIN A DURATION: 144.6 MS
ECHO PVEIN A VELOCITY: 0.3 M/S
ECHO PVEIN PEAK D VELOCITY: 0.5 M/S
ECHO PVEIN PEAK S VELOCITY: 0.5 M/S
ECHO PVEIN S/D RATIO: 1
ECHO RIGHT VENTRICULAR SYSTOLIC PRESSURE (RVSP): 28 MMHG
ECHO RV INTERNAL DIMENSION: 3.2 CM
ECHO RV LONGITUDINAL DIMENSION: 6.8 CM
ECHO RV MID DIMENSION: 3.3 CM
ECHO RVOT MEAN GRADIENT: 1 MMHG
ECHO RVOT PEAK GRADIENT: 2 MMHG
ECHO RVOT PEAK VELOCITY: 0.7 M/S
ECHO RVOT VTI: 19 CM
ECHO TV REGURGITANT MAX VELOCITY: 2.49 M/S
ECHO TV REGURGITANT PEAK GRADIENT: 25 MMHG

## 2024-04-16 PROCEDURE — 93306 TTE W/DOPPLER COMPLETE: CPT

## 2024-04-16 PROCEDURE — 93306 TTE W/DOPPLER COMPLETE: CPT | Performed by: INTERNAL MEDICINE

## 2024-09-10 ENCOUNTER — OFFICE VISIT (OUTPATIENT)
Dept: NEUROLOGY | Age: 59
End: 2024-09-10
Payer: MEDICARE

## 2024-09-10 VITALS
HEIGHT: 72 IN | DIASTOLIC BLOOD PRESSURE: 74 MMHG | BODY MASS INDEX: 31.83 KG/M2 | OXYGEN SATURATION: 98 % | HEART RATE: 79 BPM | WEIGHT: 235 LBS | SYSTOLIC BLOOD PRESSURE: 108 MMHG

## 2024-09-10 DIAGNOSIS — E55.9 VITAMIN D DEFICIENCY: ICD-10-CM

## 2024-09-10 DIAGNOSIS — F33.9 DEPRESSION, RECURRENT (HCC): ICD-10-CM

## 2024-09-10 DIAGNOSIS — F25.1 SCHIZOAFFECTIVE DISORDER, DEPRESSIVE TYPE (HCC): ICD-10-CM

## 2024-09-10 DIAGNOSIS — Z91.89 LACK OF MOTIVATION: ICD-10-CM

## 2024-09-10 DIAGNOSIS — R41.841 COGNITIVE COMMUNICATION DEFICIT: Primary | ICD-10-CM

## 2024-09-10 PROCEDURE — 99215 OFFICE O/P EST HI 40 MIN: CPT | Performed by: NURSE PRACTITIONER

## 2024-09-10 RX ORDER — BUPROPION HYDROCHLORIDE 150 MG/1
150 TABLET ORAL EVERY MORNING
COMMUNITY
Start: 2024-08-21

## 2024-09-10 RX ORDER — DIVALPROEX SODIUM 500 MG/1
TABLET, DELAYED RELEASE ORAL
COMMUNITY
Start: 2024-08-29

## 2024-09-10 RX ORDER — DONEPEZIL HYDROCHLORIDE 23 MG/1
23 TABLET, FILM COATED ORAL NIGHTLY
Qty: 90 TABLET | Refills: 0 | Status: SHIPPED | OUTPATIENT
Start: 2024-09-10 | End: 2024-12-09

## 2024-09-10 RX ORDER — MEMANTINE HYDROCHLORIDE 10 MG/1
10 TABLET ORAL 2 TIMES DAILY
Qty: 180 TABLET | Refills: 0 | Status: SHIPPED | OUTPATIENT
Start: 2024-09-10 | End: 2024-12-09

## 2024-09-10 RX ORDER — DIVALPROEX SODIUM 250 MG/1
TABLET, DELAYED RELEASE ORAL
COMMUNITY
Start: 2024-08-29

## 2025-01-13 DIAGNOSIS — R41.841 COGNITIVE COMMUNICATION DEFICIT: ICD-10-CM

## 2025-01-13 RX ORDER — MEMANTINE HYDROCHLORIDE 10 MG/1
10 TABLET ORAL 2 TIMES DAILY
Qty: 180 TABLET | Refills: 0 | Status: SHIPPED | OUTPATIENT
Start: 2025-01-13 | End: 2025-04-13

## 2025-01-21 ENCOUNTER — TELEPHONE (OUTPATIENT)
Dept: NEUROLOGY | Age: 60
End: 2025-01-21

## 2025-01-21 NOTE — TELEPHONE ENCOUNTER
Pts' wife left message that no one has called her to schedule an appointment with Dr Cronin.   Referral will need entered and Chasity will need to send a message to Dr Cronin requesting an appointment for the patient

## 2025-01-26 PROBLEM — F01.B3 MODERATE VASCULAR DEMENTIA WITH MOOD DISTURBANCE (HCC): Status: ACTIVE | Noted: 2025-01-26
